# Patient Record
Sex: MALE | Race: WHITE | URBAN - METROPOLITAN AREA
[De-identification: names, ages, dates, MRNs, and addresses within clinical notes are randomized per-mention and may not be internally consistent; named-entity substitution may affect disease eponyms.]

---

## 2021-06-01 ENCOUNTER — RECORDS - HEALTHEAST (OUTPATIENT)
Dept: ADMINISTRATIVE | Facility: CLINIC | Age: 31
End: 2021-06-01

## 2024-12-09 ENCOUNTER — HOSPITAL ENCOUNTER (EMERGENCY)
Facility: CLINIC | Age: 34
Discharge: HOME | End: 2024-12-09
Attending: EMERGENCY MEDICINE | Admitting: EMERGENCY MEDICINE
Payer: COMMERCIAL

## 2024-12-09 VITALS
DIASTOLIC BLOOD PRESSURE: 83 MMHG | BODY MASS INDEX: 31.5 KG/M2 | RESPIRATION RATE: 18 BRPM | SYSTOLIC BLOOD PRESSURE: 159 MMHG | HEIGHT: 70 IN | TEMPERATURE: 98.4 F | WEIGHT: 220 LBS | HEART RATE: 84 BPM | OXYGEN SATURATION: 99 %

## 2024-12-09 DIAGNOSIS — M54.81 OCCIPITAL NEURALGIA OF RIGHT SIDE: ICD-10-CM

## 2024-12-09 LAB
ANION GAP SERPL CALCULATED.3IONS-SCNC: 7 MMOL/L (ref 7–15)
BASOPHILS # BLD AUTO: 0 10E3/UL (ref 0–0.2)
BASOPHILS NFR BLD AUTO: 1 %
BUN SERPL-MCNC: 12.1 MG/DL (ref 6–20)
CALCIUM SERPL-MCNC: 9.3 MG/DL (ref 8.8–10.4)
CHLORIDE SERPL-SCNC: 105 MMOL/L (ref 98–107)
CREAT SERPL-MCNC: 0.88 MG/DL (ref 0.67–1.17)
EGFRCR SERPLBLD CKD-EPI 2021: >90 ML/MIN/1.73M2
EOSINOPHIL # BLD AUTO: 0.1 10E3/UL (ref 0–0.7)
EOSINOPHIL NFR BLD AUTO: 2 %
ERYTHROCYTE [DISTWIDTH] IN BLOOD BY AUTOMATED COUNT: 12.2 % (ref 10–15)
GLUCOSE SERPL-MCNC: 101 MG/DL (ref 70–99)
HCO3 SERPL-SCNC: 27 MMOL/L (ref 22–29)
HCT VFR BLD AUTO: 44.2 % (ref 40–53)
HGB BLD-MCNC: 15.3 G/DL (ref 13.3–17.7)
IMM GRANULOCYTES # BLD: 0 10E3/UL
IMM GRANULOCYTES NFR BLD: 0 %
LYMPHOCYTES # BLD AUTO: 1.6 10E3/UL (ref 0.8–5.3)
LYMPHOCYTES NFR BLD AUTO: 37 %
MCH RBC QN AUTO: 31.8 PG (ref 26.5–33)
MCHC RBC AUTO-ENTMCNC: 34.6 G/DL (ref 31.5–36.5)
MCV RBC AUTO: 92 FL (ref 78–100)
MONOCYTES # BLD AUTO: 0.3 10E3/UL (ref 0–1.3)
MONOCYTES NFR BLD AUTO: 7 %
NEUTROPHILS # BLD AUTO: 2.3 10E3/UL (ref 1.6–8.3)
NEUTROPHILS NFR BLD AUTO: 54 %
NRBC # BLD AUTO: 0 10E3/UL
NRBC BLD AUTO-RTO: 0 /100
PLATELET # BLD AUTO: 193 10E3/UL (ref 150–450)
POTASSIUM SERPL-SCNC: 4.1 MMOL/L (ref 3.4–5.3)
RBC # BLD AUTO: 4.81 10E6/UL (ref 4.4–5.9)
SODIUM SERPL-SCNC: 139 MMOL/L (ref 135–145)
WBC # BLD AUTO: 4.4 10E3/UL (ref 4–11)

## 2024-12-09 PROCEDURE — 36415 COLL VENOUS BLD VENIPUNCTURE: CPT | Performed by: EMERGENCY MEDICINE

## 2024-12-09 PROCEDURE — 85004 AUTOMATED DIFF WBC COUNT: CPT | Performed by: EMERGENCY MEDICINE

## 2024-12-09 PROCEDURE — 250N000011 HC RX IP 250 OP 636: Performed by: EMERGENCY MEDICINE

## 2024-12-09 PROCEDURE — 80048 BASIC METABOLIC PNL TOTAL CA: CPT | Performed by: EMERGENCY MEDICINE

## 2024-12-09 PROCEDURE — 85041 AUTOMATED RBC COUNT: CPT | Performed by: EMERGENCY MEDICINE

## 2024-12-09 PROCEDURE — 82565 ASSAY OF CREATININE: CPT | Performed by: EMERGENCY MEDICINE

## 2024-12-09 PROCEDURE — 84132 ASSAY OF SERUM POTASSIUM: CPT | Performed by: EMERGENCY MEDICINE

## 2024-12-09 PROCEDURE — 96374 THER/PROPH/DIAG INJ IV PUSH: CPT

## 2024-12-09 PROCEDURE — 99284 EMERGENCY DEPT VISIT MOD MDM: CPT | Mod: 25

## 2024-12-09 PROCEDURE — 96375 TX/PRO/DX INJ NEW DRUG ADDON: CPT

## 2024-12-09 RX ORDER — CYCLOBENZAPRINE HCL 10 MG
10 TABLET ORAL 3 TIMES DAILY PRN
Qty: 15 TABLET | Refills: 0 | Status: SHIPPED | OUTPATIENT
Start: 2024-12-09

## 2024-12-09 RX ORDER — KETOROLAC TROMETHAMINE 10 MG/1
10 TABLET, FILM COATED ORAL EVERY 6 HOURS PRN
Qty: 15 TABLET | Refills: 0 | Status: SHIPPED | OUTPATIENT
Start: 2024-12-09

## 2024-12-09 RX ORDER — KETOROLAC TROMETHAMINE 15 MG/ML
15 INJECTION, SOLUTION INTRAMUSCULAR; INTRAVENOUS ONCE
Status: COMPLETED | OUTPATIENT
Start: 2024-12-09 | End: 2024-12-09

## 2024-12-09 RX ORDER — DIAZEPAM 10 MG/2ML
5 INJECTION, SOLUTION INTRAMUSCULAR; INTRAVENOUS ONCE
Status: COMPLETED | OUTPATIENT
Start: 2024-12-09 | End: 2024-12-09

## 2024-12-09 RX ADMIN — KETOROLAC TROMETHAMINE 15 MG: 15 INJECTION, SOLUTION INTRAMUSCULAR; INTRAVENOUS at 08:56

## 2024-12-09 RX ADMIN — DIAZEPAM 5 MG: 5 INJECTION INTRAMUSCULAR; INTRAVENOUS at 08:55

## 2024-12-09 ASSESSMENT — ACTIVITIES OF DAILY LIVING (ADL): ADLS_ACUITY_SCORE: 41

## 2024-12-09 NOTE — Clinical Note
Socorro Orozco was seen and treated in our emergency department on 12/9/2024.  He may return to work on 12/12/2024.  Patient has occipital neuralgia which is likely secondary to hand injury and keeping his arm tense.     If you have any questions or concerns, please don't hesitate to call.      Rolf Powell MD

## 2024-12-09 NOTE — ED TRIAGE NOTES
Pt c/o head ache for one week with light headedness. Pt denies any head injury, no nausea or vomiting. Pt states he has history of migraines previously.

## 2024-12-09 NOTE — ED PROVIDER NOTES
"  Emergency Department Note      History of Present Illness     Chief Complaint   Headache      HPI   Socorro Orozco is a 33 year old male who presents with headache for last week.  He points to his right occipital notch as the area of pain.  No injuries or falls.  He is not anticoagulated.  He states that he has been having problems with his right hand which is swollen and feels like there is abs going down the arm.  This is new on for quite some time.  He is right-hand dominant.  There is no fever, chills, nausea, vomiting, diarrhea or upper respiratory type symptoms.  No chest pain or shortness of breath.  States he is not taking any medications.  No other complaints at this time.    Independent Historian   None    Review of External Notes   I reviewed his Ortho visit from December 4    Past Medical History     Medical History and Problem List   Migraine     Medications   tramadol  albuterol  prednisone    Surgical History   No past surgical history on file.    Physical Exam     Patient Vitals for the past 24 hrs:   BP Temp Temp src Pulse Resp SpO2 Height Weight   12/09/24 0826 (!) 159/83 98.4  F (36.9  C) Temporal 84 18 99 % 1.778 m (5' 10\") 99.8 kg (220 lb)     Physical Exam  Constitutional: Vital signs reviewed as above  General: Alert, pleasant  HEENT: Moist mucous membranes  Eyes: Pupils are equal, round, and reactive to light.   Neck: Normal range of motion.  Tender to palpation over the right occipital notch.  Cardiovascular: normal rate, Regular rhythm and normal heart sounds.  Mo MRG  Pulmonary/Chest: Effort normal and breath sounds normal. No respiratory distress. Patient has no wheezes. Patient has no rales.   Gastrointestinal: Soft. Positive bowel sounds. No MRG.  Musculoskeletal/Extremities: Full ROM.  Endo: No pitting edema  Neurological: A/O x 3. CN-II-XII intact bilaterally. No pronator drift. Normal strength and sensation throughout all 4 extremities.  Skin: Skin is warm and dry.   Psychiatric: " Pleasant     Diagnostics     Lab Results   Labs Ordered and Resulted from Time of ED Arrival to Time of ED Departure   BASIC METABOLIC PANEL - Abnormal       Result Value    Sodium 139      Potassium 4.1      Chloride 105      Carbon Dioxide (CO2) 27      Anion Gap 7      Urea Nitrogen 12.1      Creatinine 0.88      GFR Estimate >90      Calcium 9.3      Glucose 101 (*)    CBC WITH PLATELETS AND DIFFERENTIAL    WBC Count 4.4      RBC Count 4.81      Hemoglobin 15.3      Hematocrit 44.2      MCV 92      MCH 31.8      MCHC 34.6      RDW 12.2      Platelet Count 193      % Neutrophils 54      % Lymphocytes 37      % Monocytes 7      % Eosinophils 2      % Basophils 1      % Immature Granulocytes 0      NRBCs per 100 WBC 0      Absolute Neutrophils 2.3      Absolute Lymphocytes 1.6      Absolute Monocytes 0.3      Absolute Eosinophils 0.1      Absolute Basophils 0.0      Absolute Immature Granulocytes 0.0      Absolute NRBCs 0.0         Imaging   No orders to display     Independent Interpretation   None    ED Course      Medications Administered   Medications   ketorolac (TORADOL) injection 15 mg (15 mg Intravenous $Given 12/9/24 2110)   diazepam (VALIUM) injection 5 mg (5 mg Intravenous $Given 12/9/24 3364)       Procedures   Procedures     Discussion of Management   None    ED Course        Additional Documentation  None    Medical Decision Making / Diagnosis     CMS Diagnoses: None    MIPS       None    MDM   Socorro Orozco is a 33 year old male who presents with the above-mentioned complaints.  He does have tenderness over the right occipital notch and his physical exam findings are consistent with occipital neuralgia.  He was given Toradol and Valium via an IV and was feeling better.  CBC and BMP are both unremarkable.  Again neurological exam is nonfocal and I do not think imaging is warranted.  He does have a recent head injury and he has been favoring that arm and keeping it stiff which certainly could be  causing pressure in his neck and pressing on the septal nerve.  I will discharge him home with Flexeril and Toradol.  Follow-up as needed.    Disposition   The patient was discharged.     Diagnosis     ICD-10-CM    1. Occipital neuralgia of right side  M54.81            Discharge Medications   New Prescriptions    CYCLOBENZAPRINE (FLEXERIL) 10 MG TABLET    Take 1 tablet (10 mg) by mouth 3 times daily as needed for muscle spasms.    KETOROLAC (TORADOL) 10 MG TABLET    Take 1 tablet (10 mg) by mouth every 6 hours as needed.         MD Andre Paulino, Rolf Borja MD  12/09/24 0958

## 2025-02-17 ENCOUNTER — HOSPITAL ENCOUNTER (EMERGENCY)
Facility: CLINIC | Age: 35
Discharge: HOME | End: 2025-02-17
Attending: EMERGENCY MEDICINE | Admitting: EMERGENCY MEDICINE
Payer: COMMERCIAL

## 2025-02-17 ENCOUNTER — APPOINTMENT (OUTPATIENT)
Dept: GENERAL RADIOLOGY | Facility: CLINIC | Age: 35
End: 2025-02-17
Attending: EMERGENCY MEDICINE
Payer: COMMERCIAL

## 2025-02-17 VITALS
RESPIRATION RATE: 20 BRPM | OXYGEN SATURATION: 100 % | HEIGHT: 70 IN | SYSTOLIC BLOOD PRESSURE: 128 MMHG | DIASTOLIC BLOOD PRESSURE: 81 MMHG | WEIGHT: 238 LBS | BODY MASS INDEX: 34.07 KG/M2 | HEART RATE: 108 BPM | TEMPERATURE: 101 F

## 2025-02-17 DIAGNOSIS — N17.9 AKI (ACUTE KIDNEY INJURY): ICD-10-CM

## 2025-02-17 DIAGNOSIS — B34.9 ACUTE VIRAL SYNDROME: ICD-10-CM

## 2025-02-17 DIAGNOSIS — J06.9 VIRAL UPPER RESPIRATORY TRACT INFECTION: ICD-10-CM

## 2025-02-17 DIAGNOSIS — M79.10 MYALGIA: ICD-10-CM

## 2025-02-17 DIAGNOSIS — J10.1 INFLUENZA A: Primary | ICD-10-CM

## 2025-02-17 DIAGNOSIS — K52.9 GASTROENTERITIS: ICD-10-CM

## 2025-02-17 LAB
ALBUMIN SERPL BCG-MCNC: 4.4 G/DL (ref 3.5–5.2)
ALP SERPL-CCNC: 41 U/L (ref 40–150)
ALT SERPL W P-5'-P-CCNC: 43 U/L (ref 0–70)
ANION GAP SERPL CALCULATED.3IONS-SCNC: 10 MMOL/L (ref 7–15)
AST SERPL W P-5'-P-CCNC: 29 U/L (ref 0–45)
ATRIAL RATE - MUSE: 117 BPM
BASOPHILS # BLD AUTO: 0 10E3/UL (ref 0–0.2)
BASOPHILS NFR BLD AUTO: 0 %
BILIRUB SERPL-MCNC: 0.3 MG/DL
BUN SERPL-MCNC: 12.9 MG/DL (ref 6–20)
CALCIUM SERPL-MCNC: 9.4 MG/DL (ref 8.8–10.4)
CHLORIDE SERPL-SCNC: 103 MMOL/L (ref 98–107)
CREAT SERPL-MCNC: 1.23 MG/DL (ref 0.67–1.17)
DIASTOLIC BLOOD PRESSURE - MUSE: NORMAL MMHG
EGFRCR SERPLBLD CKD-EPI 2021: 79 ML/MIN/1.73M2
EOSINOPHIL # BLD AUTO: 0 10E3/UL (ref 0–0.7)
EOSINOPHIL NFR BLD AUTO: 0 %
ERYTHROCYTE [DISTWIDTH] IN BLOOD BY AUTOMATED COUNT: 13.2 % (ref 10–15)
FLUAV RNA SPEC QL NAA+PROBE: POSITIVE
FLUBV RNA RESP QL NAA+PROBE: NEGATIVE
GLUCOSE SERPL-MCNC: 102 MG/DL (ref 70–99)
HCO3 SERPL-SCNC: 26 MMOL/L (ref 22–29)
HCT VFR BLD AUTO: 43.8 % (ref 40–53)
HGB BLD-MCNC: 15 G/DL (ref 13.3–17.7)
IMM GRANULOCYTES # BLD: 0 10E3/UL
IMM GRANULOCYTES NFR BLD: 0 %
INTERPRETATION ECG - MUSE: NORMAL
LIPASE SERPL-CCNC: 29 U/L (ref 13–60)
LYMPHOCYTES # BLD AUTO: 0.4 10E3/UL (ref 0.8–5.3)
LYMPHOCYTES NFR BLD AUTO: 7 %
MCH RBC QN AUTO: 31.7 PG (ref 26.5–33)
MCHC RBC AUTO-ENTMCNC: 34.2 G/DL (ref 31.5–36.5)
MCV RBC AUTO: 93 FL (ref 78–100)
MONOCYTES # BLD AUTO: 0.6 10E3/UL (ref 0–1.3)
MONOCYTES NFR BLD AUTO: 11 %
NEUTROPHILS # BLD AUTO: 4.2 10E3/UL (ref 1.6–8.3)
NEUTROPHILS NFR BLD AUTO: 81 %
NRBC # BLD AUTO: 0 10E3/UL
NRBC BLD AUTO-RTO: 0 /100
P AXIS - MUSE: 39 DEGREES
PLATELET # BLD AUTO: 185 10E3/UL (ref 150–450)
POTASSIUM SERPL-SCNC: 4.1 MMOL/L (ref 3.4–5.3)
PR INTERVAL - MUSE: 154 MS
PROT SERPL-MCNC: 7.6 G/DL (ref 6.4–8.3)
QRS DURATION - MUSE: 78 MS
QT - MUSE: 288 MS
QTC - MUSE: 401 MS
R AXIS - MUSE: 69 DEGREES
RBC # BLD AUTO: 4.73 10E6/UL (ref 4.4–5.9)
RSV RNA SPEC NAA+PROBE: NEGATIVE
S PYO DNA THROAT QL NAA+PROBE: NOT DETECTED
SARS-COV-2 RNA RESP QL NAA+PROBE: NEGATIVE
SODIUM SERPL-SCNC: 139 MMOL/L (ref 135–145)
SYSTOLIC BLOOD PRESSURE - MUSE: NORMAL MMHG
T AXIS - MUSE: -2 DEGREES
TROPONIN T SERPL HS-MCNC: <6 NG/L
VENTRICULAR RATE- MUSE: 117 BPM
WBC # BLD AUTO: 5.1 10E3/UL (ref 4–11)

## 2025-02-17 PROCEDURE — 99285 EMERGENCY DEPT VISIT HI MDM: CPT | Mod: 25 | Performed by: EMERGENCY MEDICINE

## 2025-02-17 PROCEDURE — 87637 SARSCOV2&INF A&B&RSV AMP PRB: CPT | Performed by: EMERGENCY MEDICINE

## 2025-02-17 PROCEDURE — 36415 COLL VENOUS BLD VENIPUNCTURE: CPT | Performed by: EMERGENCY MEDICINE

## 2025-02-17 PROCEDURE — 250N000013 HC RX MED GY IP 250 OP 250 PS 637: Performed by: EMERGENCY MEDICINE

## 2025-02-17 PROCEDURE — 258N000003 HC RX IP 258 OP 636: Performed by: EMERGENCY MEDICINE

## 2025-02-17 PROCEDURE — 96374 THER/PROPH/DIAG INJ IV PUSH: CPT | Performed by: EMERGENCY MEDICINE

## 2025-02-17 PROCEDURE — 87651 STREP A DNA AMP PROBE: CPT | Performed by: EMERGENCY MEDICINE

## 2025-02-17 PROCEDURE — 93005 ELECTROCARDIOGRAM TRACING: CPT | Performed by: EMERGENCY MEDICINE

## 2025-02-17 PROCEDURE — 82565 ASSAY OF CREATININE: CPT | Performed by: EMERGENCY MEDICINE

## 2025-02-17 PROCEDURE — 71046 X-RAY EXAM CHEST 2 VIEWS: CPT

## 2025-02-17 PROCEDURE — 250N000011 HC RX IP 250 OP 636: Performed by: EMERGENCY MEDICINE

## 2025-02-17 PROCEDURE — 84484 ASSAY OF TROPONIN QUANT: CPT | Performed by: EMERGENCY MEDICINE

## 2025-02-17 PROCEDURE — 85025 COMPLETE CBC W/AUTO DIFF WBC: CPT | Performed by: EMERGENCY MEDICINE

## 2025-02-17 PROCEDURE — 96361 HYDRATE IV INFUSION ADD-ON: CPT | Performed by: EMERGENCY MEDICINE

## 2025-02-17 PROCEDURE — 83690 ASSAY OF LIPASE: CPT | Performed by: EMERGENCY MEDICINE

## 2025-02-17 RX ORDER — ACETAMINOPHEN 325 MG/1
975 TABLET ORAL ONCE
Status: COMPLETED | OUTPATIENT
Start: 2025-02-17 | End: 2025-02-17

## 2025-02-17 RX ORDER — OSELTAMIVIR PHOSPHATE 75 MG/1
75 CAPSULE ORAL 2 TIMES DAILY
Qty: 10 CAPSULE | Refills: 0 | Status: SHIPPED | OUTPATIENT
Start: 2025-02-17 | End: 2025-02-22

## 2025-02-17 RX ORDER — ONDANSETRON 4 MG/1
4 TABLET, ORALLY DISINTEGRATING ORAL EVERY 8 HOURS PRN
Qty: 10 TABLET | Refills: 0 | Status: SHIPPED | OUTPATIENT
Start: 2025-02-17

## 2025-02-17 RX ORDER — KETOROLAC TROMETHAMINE 15 MG/ML
15 INJECTION, SOLUTION INTRAMUSCULAR; INTRAVENOUS ONCE
Status: COMPLETED | OUTPATIENT
Start: 2025-02-17 | End: 2025-02-17

## 2025-02-17 RX ADMIN — KETOROLAC TROMETHAMINE 15 MG: 15 INJECTION, SOLUTION INTRAMUSCULAR; INTRAVENOUS at 12:03

## 2025-02-17 RX ADMIN — SODIUM CHLORIDE 1000 ML: 0.9 INJECTION, SOLUTION INTRAVENOUS at 12:02

## 2025-02-17 RX ADMIN — ACETAMINOPHEN 975 MG: 325 TABLET, FILM COATED ORAL at 12:00

## 2025-02-17 ASSESSMENT — ACTIVITIES OF DAILY LIVING (ADL)
ADLS_ACUITY_SCORE: 41

## 2025-02-17 NOTE — ED TRIAGE NOTES
"Cough, fever, SOB, congestion. Took tylenol last night, nothing this morning. Pt reports chest pain last night with cough, \"slept through it\"     Triage Assessment (Adult)       Row Name 02/17/25 0954          Triage Assessment    Airway WDL WDL        Cognitive/Neuro/Behavioral WDL    Cognitive/Neuro/Behavioral WDL WDL                     "

## 2025-02-17 NOTE — ED PROVIDER NOTES
"  Emergency Department Note      History of Present Illness     Chief Complaint   Cough    HPI   Socorro Orozco is a 34 year old male who presents to the ED for evaluation of flulike symptoms. The patient reports experiencing generalized body aches, cough with yellowish congestion, nausea, vomiting, upper abdominal pain, diarrhea, headache, chills, and fever since yesterday.  He also endorses sternal chest discomfort that is worse with coughing and some shortness of breath.  He tried taking excedrin and tylenol last night but they provided incomplete relief. Denies taking any medications this morning. Denies dysuria or hematuria. He didn't receive his influenza immunization this year.     Independent Historian   None    Review of External Notes   I reviewed patient's 11/26/2016      Past Medical History     Medical History and Problem List   Migraines    Medications   Albuterol inhaler    Physical Exam     Patient Vitals for the past 24 hrs:   BP Temp Temp src Pulse Resp SpO2 Height Weight   02/17/25 1248 -- -- -- 108 -- -- -- --   02/17/25 1229 128/81 101  F (38.3  C) Oral 115 20 100 % -- --   02/17/25 0957 136/74 (!) 102.5  F (39.2  C) Oral 117 20 99 % 1.778 m (5' 10\") 108 kg (238 lb)     Physical Exam  Constitutional:       Appearance: Normal appearance.   HENT:      Head: Normocephalic and atraumatic.   Eyes:      Extraocular Movements: Extraocular movements intact.      Conjunctiva/sclera: Conjunctivae normal.   Cardiovascular:      Rate and Rhythm: Mildly tachycardic rate and regular rhythm.   Pulmonary:      Effort: Pulmonary effort is normal. No respiratory distress.      Breath sounds: Clear to auscultation bilaterally.  No wheezing.  No crackles.  Abdominal:      General: Abdomen is flat. There is no distension.      Palpations: Abdomen is soft.      Tenderness: There is no abdominal tenderness.   Musculoskeletal:      Cervical back: Normal range of motion. No rigidity.       Right lower leg: No edema.    "   Left lower leg: No edema.   Skin:     General: Skin is warm and dry.   Neurological:      General: No focal deficit present.      Mental Status: Alert and oriented to person, place, and time.   Psychiatric:         Mood and Affect: Mood normal.         Behavior: Behavior normal.    Diagnostics     Lab Results   Labs Ordered and Resulted from Time of ED Arrival to Time of ED Departure   INFLUENZA A/B, RSV AND SARS-COV2 PCR - Abnormal       Result Value    Influenza A PCR Positive (*)     Influenza B PCR Negative      RSV PCR Negative      SARS CoV2 PCR Negative     COMPREHENSIVE METABOLIC PANEL - Abnormal    Sodium 139      Potassium 4.1      Carbon Dioxide (CO2) 26      Anion Gap 10      Urea Nitrogen 12.9      Creatinine 1.23 (*)     GFR Estimate 79      Calcium 9.4      Chloride 103      Glucose 102 (*)     Alkaline Phosphatase 41      AST 29      ALT 43      Protein Total 7.6      Albumin 4.4      Bilirubin Total 0.3     CBC WITH PLATELETS AND DIFFERENTIAL - Abnormal    WBC Count 5.1      RBC Count 4.73      Hemoglobin 15.0      Hematocrit 43.8      MCV 93      MCH 31.7      MCHC 34.2      RDW 13.2      Platelet Count 185      % Neutrophils 81      % Lymphocytes 7      % Monocytes 11      % Eosinophils 0      % Basophils 0      % Immature Granulocytes 0      NRBCs per 100 WBC 0      Absolute Neutrophils 4.2      Absolute Lymphocytes 0.4 (*)     Absolute Monocytes 0.6      Absolute Eosinophils 0.0      Absolute Basophils 0.0      Absolute Immature Granulocytes 0.0      Absolute NRBCs 0.0     LIPASE - Normal    Lipase 29     TROPONIN T, HIGH SENSITIVITY - Normal    Troponin T, High Sensitivity <6     GROUP A STREPTOCOCCUS PCR THROAT SWAB - Normal    Group A strep by PCR Not Detected       Imaging   XR Chest 2 Views   Final Result   IMPRESSION: Negative chest.        EKG   Sinus tachycardia.  Rate of 117.  .  QRS 78.  QTc 481.  No acute ST elevation or depression.  No prior ECG for comparison.  Slightly  early repolarization type appearance in the anterior leads.    Independent Interpretation   On my independent interpretation of chest x-ray, there is no obvious focal opacity, mediastinal widening, or pneumothorax.    ED Course      Medications Administered   Medications   sodium chloride 0.9% BOLUS 1,000 mL (0 mLs Intravenous Stopped 2/17/25 1241)   ketorolac (TORADOL) injection 15 mg (15 mg Intravenous $Given 2/17/25 1203)   acetaminophen (TYLENOL) tablet 975 mg (975 mg Oral $Given 2/17/25 1200)     ED Course   ED Course as of 02/17/25 1738   Mon Feb 17, 2025   1138 I obtained history and examined the patient as noted above.    1149 EKG 12-lead, tracing only  Sinus tachycardia.  Rate of 117.  .  QRS 78.  QTc 481.  No acute ST elevation or depression.  No prior ECG for comparison.  Slightly early repolarization type appearance in the anterior leads.   1205 XR Chest 2 Views  On my independent interpretation of chest x-ray, there is no obvious focal opacity, mediastinal widening, or pneumothorax.   1227 Notified by RN that patient would like to sign out AMA as he does not wish to continue to wait in the result pending area as he would like to go home and lay down.  I will go reevaluate the patient.   1231 Pulse: 115  Still mildly tachycardic, but likely secondary to fever.  Fever improving to 101  F.   1235 Reevaluated patient.  Extensive discussion with the patient that given he is still tachycardic and not feeling well and troponin result has not returned yet, it is inadvisable for him to sign out against medical advice.  Discussed potential life-threatening diagnoses such as myocarditis or ACS if patient decides to leave prior to troponin results.  Patient voiced understanding and still would prefer to be discharged home at this time as he just wishes to go home to lay down due to his myalgia.   1243 Influenza A(!): Positive   1243 I rechecked the patient and explained findings.      Additional  Documentation  None    Medical Decision Making / Diagnosis     MDM   Socorro Orozco is a 34-year-old male as described above presents to the emergency department for flulike illness including rhinorrhea, congestion, cough, headache with coughing, sternal chest discomfort, shortness of breath, nausea, diarrhea, and generalized bodyaches.  Patient hemodynamically stable at time of evaluation.  Fever of 102.5  F which certainly explains patient's sensation of chills.  Tachycardic at 117 likely secondary to acute fever.  Will obtain infectious workup.  Nonetheless, at this time, low suspicion for sepsis and patient's conglomeration of syndrome most consistent with acute viral syndrome especially given recent local increase in incidence of influenza.  Will obtain chest x-ray for screening for secondary bacterial pneumonia.  Cardiac enzyme testing for screening for pericarditis/myocarditis secondary to acute viral illness.  Nonetheless, at this time, given patient's young age, low suspicion for ischemic cardiac disease.  Symptoms have been ongoing for more than 6 hours.  1 set high sensitive troponin sufficient.  Abdominal pain lab work, but suspect viral gastroenteritis.  At this time, abdominal exam is otherwise benign without any indication for emergent advanced imaging of the abdomen at this time.  Nonetheless, if changing abdominal examination or significant lab derangements, may consider CT abdomen pelvis.  IV fluid hydration.  Toradol and Tylenol for body aches and fever management.  EKG otherwise unremarkable.  Patient is not hypoxic.  Suspect pleuritic type chest discomfort and tachycardia likely secondary to viral URI and less likely pulmonary embolism especially given no risk factors or pre-existing history.  In the absence of fever, patient would likely be PERC negative.  May consider D-dimer/CT PE if viral swab negative and patient develops new hypoxia or persistent tachycardia.  Discussed care plan with  patient who voiced understanding and agreement with plan.  Answered all questions.  Additional workup and orders as listed in chart.     Ultimately, work up shows influenza A which certainly explain the conglomeration of all patient's symptoms today.  Troponin negative for myocarditis.  EKG otherwise unremarkable.  No significant leukocytosis to suggest severe systemic infection/sepsis.  Lab work demonstrates mild increase in creatinine likely suggestive of hypovolemia.  Patient received IV fluids and with fever control, heart rate improved improved as compared with prior.  Initially, patient wishes to sign out AMA prior to completion of workup and lab work, but thankfully, patient's workup (viral swab and troponin) resulted just as patient was about to leave the department.  Patient was discharged with Tamiflu and Zofran prescription after shared decision discussion due to the severity of patient's symptoms.  Advised continued home supportive treatment and copious fluid hydration.  Discussed tricked return precautions.  Answered all questions.  Patient and significant other at bedside voiced understanding and agreement with plan and comfortable discharge home.      Please refer to ED course above as part of continuation of MDM for details on the patient's treatment course and any potential changes or updates beyond my initial evaluation and MDM creation.    Disposition   The patient was discharged.     Diagnosis     ICD-10-CM    1. Influenza A  J10.1       2. Acute viral syndrome  B34.9       3. Viral upper respiratory tract infection  J06.9       4. Gastroenteritis  K52.9       5. TANYA (acute kidney injury)  N17.9       6. Myalgia  M79.10          Discharge Medications   Discharge Medication List as of 2/17/2025 12:47 PM        START taking these medications    Details   ondansetron (ZOFRAN ODT) 4 MG ODT tab Take 1 tablet (4 mg) by mouth every 8 hours as needed for nausea or vomiting., Disp-10 tablet, R-0,  E-Prescribe      oseltamivir (TAMIFLU) 75 MG capsule Take 1 capsule (75 mg) by mouth 2 times daily for 5 days., Disp-10 capsule, R-0, E-Prescribe           Scribe Disclosure:  I, Javi Travis, am serving as a scribe at 12:42 PM on 2/17/2025 to document services personally performed by Benoit Medina DO, based on my observations and the provider's statements to me.        Benoit Medina DO  02/17/25 0297

## 2025-02-17 NOTE — DISCHARGE INSTRUCTIONS
Please follow-up with your primary care provider and/or specialist regarding your visit to the ER today. If you change your mind, you may return to the ER at any time.    Please return to the emergency department should you change your mind or you experience any of the symptoms we specifically discussed, including but not limited to recurrence or worsening of your symptoms, or development of any new and concerning symptoms such as worsening fever, chest pain, shortness of breath, nausea, or vomiting.    Please make sure you drink plenty of fluids as there are signs of dehydration today. Pedialyte would be a good option.    Please take medication as instructed on bottle.

## 2025-08-09 ENCOUNTER — NURSE TRIAGE (OUTPATIENT)
Dept: NURSING | Facility: CLINIC | Age: 35
End: 2025-08-09

## 2025-08-09 ENCOUNTER — VIRTUAL VISIT (OUTPATIENT)
Dept: URGENT CARE | Facility: CLINIC | Age: 35
End: 2025-08-09
Payer: COMMERCIAL

## 2025-08-09 DIAGNOSIS — Z20.2 TRICHOMONAS EXPOSURE: Primary | ICD-10-CM

## 2025-08-09 DIAGNOSIS — Z11.3 SCREEN FOR STD (SEXUALLY TRANSMITTED DISEASE): ICD-10-CM

## 2025-08-09 PROCEDURE — 98010 SYNCH AUDIO-ONLY NEW MOD 45: CPT

## 2025-08-09 RX ORDER — METRONIDAZOLE 500 MG/1
2000 TABLET ORAL ONCE
Qty: 4 TABLET | Refills: 0 | Status: SHIPPED | OUTPATIENT
Start: 2025-08-09 | End: 2025-08-09